# Patient Record
Sex: FEMALE | Race: WHITE | NOT HISPANIC OR LATINO | Employment: OTHER | URBAN - METROPOLITAN AREA
[De-identification: names, ages, dates, MRNs, and addresses within clinical notes are randomized per-mention and may not be internally consistent; named-entity substitution may affect disease eponyms.]

---

## 2018-04-03 ENCOUNTER — OFFICE VISIT (OUTPATIENT)
Dept: URGENT CARE | Facility: CLINIC | Age: 78
End: 2018-04-03

## 2018-04-03 VITALS
DIASTOLIC BLOOD PRESSURE: 92 MMHG | RESPIRATION RATE: 16 BRPM | HEIGHT: 63 IN | SYSTOLIC BLOOD PRESSURE: 172 MMHG | OXYGEN SATURATION: 99 % | WEIGHT: 138 LBS | HEART RATE: 110 BPM | TEMPERATURE: 97 F | BODY MASS INDEX: 24.45 KG/M2

## 2018-04-03 DIAGNOSIS — M54.5 ACUTE MIDLINE LOW BACK PAIN, WITH SCIATICA PRESENCE UNSPECIFIED: ICD-10-CM

## 2018-04-03 DIAGNOSIS — M62.830 BACK MUSCLE SPASM: ICD-10-CM

## 2018-04-03 PROCEDURE — 99204 OFFICE O/P NEW MOD 45 MIN: CPT | Performed by: NURSE PRACTITIONER

## 2018-04-03 RX ORDER — ALPRAZOLAM 0.25 MG/1
TABLET ORAL
COMMUNITY
Start: 2018-03-29

## 2018-04-03 RX ORDER — CYCLOBENZAPRINE HCL 5 MG
2.5 TABLET ORAL 3 TIMES DAILY PRN
Qty: 6 TAB | Refills: 0 | Status: SHIPPED | OUTPATIENT
Start: 2018-04-03 | End: 2018-04-05

## 2018-04-03 ASSESSMENT — ENCOUNTER SYMPTOMS
PERIANAL NUMBNESS: 0
DIZZINESS: 0
PARESTHESIAS: 0
VOMITING: 0
WEAKNESS: 0
CHILLS: 0
NAUSEA: 0
PARESIS: 0
SORE THROAT: 0
BACK PAIN: 1
SHORTNESS OF BREATH: 0
MYALGIAS: 0
EYE PAIN: 0
FEVER: 0

## 2018-04-03 NOTE — PROGRESS NOTES
Subjective:     Nicole Tamez is a 77 y.o. female who presents for Back Pain (pt states she was in a train and in a turn she held on and hurt lower back and (L) leg)   Patient presents to clinic today with complaints of low back muscle soreness. Patient is traveling by train from [place] to [place] and she slipped down the train stairs. Patient has a bruise on her left shin. Patient also broke to acrylic fingernails. Patient states she's been taking Motrin for the pain and discomfort. Patient states She is very sore.     Back Pain   This is a new problem. The current episode started yesterday. The problem occurs constantly. The problem is unchanged. The pain is present in the lumbar spine. Quality: sre. The pain does not radiate. The pain is moderate. The pain is the same all the time. The symptoms are aggravated by bending, position and twisting. Stiffness is present all day. Pertinent negatives include no chest pain, dysuria, fever, paresis, paresthesias, perianal numbness or weakness. She has tried NSAIDs for the symptoms. The treatment provided no relief.   History reviewed. No pertinent past medical history.History reviewed. No pertinent surgical history.  Social History     Social History   • Marital status:      Spouse name: N/A   • Number of children: N/A   • Years of education: N/A     Occupational History   • Not on file.     Social History Main Topics   • Smoking status: Never Smoker   • Smokeless tobacco: Never Used   • Alcohol use Not on file   • Drug use: Unknown   • Sexual activity: Not on file     Other Topics Concern   • Not on file     Social History Narrative   • No narrative on file      Family History   Problem Relation Age of Onset   • Family history unknown: Yes    Review of Systems   Constitutional: Negative for chills and fever.   HENT: Negative for sore throat.    Eyes: Negative for pain.   Respiratory: Negative for shortness of breath.    Cardiovascular: Negative for chest  "pain.   Gastrointestinal: Negative for nausea and vomiting.   Genitourinary: Negative for dysuria and hematuria.   Musculoskeletal: Positive for back pain. Negative for myalgias.        Low back muscle spasm, soreness     Skin: Negative for rash.   Neurological: Negative for dizziness, weakness and paresthesias.     Allergies   Allergen Reactions   • Penicillins       Objective:   BP (!) 172/92   Pulse (!) 110   Temp 36.1 °C (97 °F)   Resp 16   Ht 1.6 m (5' 3\")   Wt 62.6 kg (138 lb)   SpO2 99%   BMI 24.45 kg/m²   Physical Exam   Constitutional: She is oriented to person, place, and time. She appears well-developed and well-nourished. No distress.   HENT:   Head: Normocephalic and atraumatic.   Eyes: Conjunctivae and EOM are normal. Pupils are equal, round, and reactive to light.   Cardiovascular: Normal rate and regular rhythm.    No murmur heard.  Pulmonary/Chest: Effort normal and breath sounds normal. No respiratory distress.   Abdominal: Soft. She exhibits no distension. There is no tenderness.   Musculoskeletal:        Lumbar back: She exhibits tenderness, pain and spasm.        Back:    Neurological: She is alert and oriented to person, place, and time. She has normal reflexes. No sensory deficit.   Skin: Skin is warm and dry. Ecchymosis noted.        Psychiatric: She has a normal mood and affect.         Assessment/Plan:   Assessment    1. Back muscle spas  2. Acute midline low back pain, with sciatica presence unspecified  - Diclofenac Sodium 1 % Gel; Apply 1 Application to skin as directed 3 times a day.  Dispense: 1 Tube; Refill: 0  - cyclobenzaprine (FLEXERIL) 5 MG tablet; Take 0.5 Tabs by mouth 3 times a day as needed for up to 2 days.  Dispense: 6 Tab; Refill: 0    Advised Patient of side effects of medication advised patient to use caution. Advised patient to take half tablet. Advised patient not to use Xanax in combination.  Avoid bending, stooping, heavy or repetitive lifting until symptoms " resolve.  Avoid prolonged sitting; frequent changes of position may be helpful.  Begin gentle stretching before activity when tolerated.      Patient given precautionary s/sx that mandate immediate follow up and evaluation in the ED. Advised of risks of not doing so.    DDX, Supportive care, and indications for immediate follow-up discussed with patient.    Instructed to return to clinic or nearest emergency department if we are not available for any change in condition, further concerns, or worsening of symptoms.    The patient demonstrated a good understanding and agreed with the treatment plan.